# Patient Record
Sex: MALE | Race: OTHER | NOT HISPANIC OR LATINO
[De-identification: names, ages, dates, MRNs, and addresses within clinical notes are randomized per-mention and may not be internally consistent; named-entity substitution may affect disease eponyms.]

---

## 2023-06-30 PROBLEM — Z00.00 ENCOUNTER FOR PREVENTIVE HEALTH EXAMINATION: Status: ACTIVE | Noted: 2023-06-30

## 2023-07-03 ENCOUNTER — APPOINTMENT (OUTPATIENT)
Dept: NEUROSURGERY | Facility: CLINIC | Age: 73
End: 2023-07-03
Payer: MEDICARE

## 2023-07-03 VITALS — WEIGHT: 205 LBS | HEIGHT: 70 IN | BODY MASS INDEX: 29.35 KG/M2

## 2023-07-03 VITALS — BODY MASS INDEX: 29.35 KG/M2 | WEIGHT: 205 LBS | HEIGHT: 70 IN

## 2023-07-03 DIAGNOSIS — Z78.9 OTHER SPECIFIED HEALTH STATUS: ICD-10-CM

## 2023-07-03 DIAGNOSIS — I10 ESSENTIAL (PRIMARY) HYPERTENSION: ICD-10-CM

## 2023-07-03 DIAGNOSIS — M43.17 SPONDYLOLISTHESIS, LUMBOSACRAL REGION: ICD-10-CM

## 2023-07-03 DIAGNOSIS — M25.551 PAIN IN RIGHT HIP: ICD-10-CM

## 2023-07-03 DIAGNOSIS — M25.552 PAIN IN RIGHT HIP: ICD-10-CM

## 2023-07-03 DIAGNOSIS — M48.062 SPINAL STENOSIS, LUMBAR REGION WITH NEUROGENIC CLAUDICATION: ICD-10-CM

## 2023-07-03 PROCEDURE — 99213 OFFICE O/P EST LOW 20 MIN: CPT

## 2023-07-03 PROCEDURE — 99204 OFFICE O/P NEW MOD 45 MIN: CPT

## 2023-07-03 RX ORDER — OXYCODONE AND ACETAMINOPHEN 5; 325 MG/1; MG/1
5-325 TABLET ORAL
Refills: 0 | Status: ACTIVE | COMMUNITY

## 2023-07-03 RX ORDER — HYDROCHLOROTHIAZIDE 12.5 MG/1
CAPSULE ORAL
Refills: 0 | Status: ACTIVE | COMMUNITY

## 2023-07-03 RX ORDER — AMLODIPINE BESYLATE 5 MG/1
TABLET ORAL
Refills: 0 | Status: ACTIVE | COMMUNITY

## 2023-07-03 RX ORDER — METFORMIN HYDROCHLORIDE 625 MG/1
TABLET ORAL
Refills: 0 | Status: ACTIVE | COMMUNITY

## 2023-07-03 RX ORDER — ATORVASTATIN CALCIUM 80 MG/1
TABLET, FILM COATED ORAL
Refills: 0 | Status: ACTIVE | COMMUNITY

## 2023-07-03 RX ORDER — ESCITALOPRAM OXALATE 5 MG/1
5 TABLET ORAL
Refills: 0 | Status: ACTIVE | COMMUNITY

## 2023-07-03 RX ORDER — HYDROXYZINE HYDROCHLORIDE 50 MG/1
50 TABLET ORAL
Refills: 0 | Status: ACTIVE | COMMUNITY

## 2023-07-03 NOTE — HISTORY OF PRESENT ILLNESS
[de-identified] : HPI: \par \par Mr. TAYLOR has a longstanding history of chronic lower back pain with neurogenic claudication into the bilateral lower extremities.  He is under the care of Dr. Arango for pain management.  He has tried various interventional procedures such as lumbar epidurals and lumbar radiofrequency with temporary relief. He has also trialed PT. He also reports chronic numbness in his feet mainly under his toes.  He is a diabetic.  Symptoms worsen with prolonged standing and walking.  Sitting is relatively comfortable.\par \par His last MRI of the lumbar spine was from 7/6/2022 at [facility].  Per the report he is found to have a grade 1 listhesis at L3-4 with moderate to severe stenosis.  There is clumping of the nerve roots at this segment suggestive of arachnoiditis.\par \par He is medically managed by pain management on gabapentin and oxycodone as needed. No bowel / bladder dysfunction.\par \par PHYSICAL EXAM: \par Constitutional: Well appearing, no distress\par HEENT: Normocephalic Atraumatic\par Psychiatric: Alert and oriented to all spheres, normal mood\par Pulmonary: No respiratory distress\par Neurologic: \par CN II-XII grossly intact\par Palpation: no pain to palpation \par Strength: Full strength in all major muscle groups\par Sensation: Full sensation to light touch in all extremities, however he has numbness on the plantar aspects of his feet. \par Reflexes: \par  2+ patellar\par  2+ ankle jerk\par Restriction in ROM LS spine. \par Signs:\par SLR negative\par Gait: steady, walking w/o assistance. \par \par \par \par

## 2023-07-03 NOTE — ASSESSMENT
[FreeTextEntry1] : Mr. TAYLOR presents with chronic lower back pain and neurogenic claudication into the lower extremities.  He will proceed with an updated MRI of the lumbar spine for surgical planning.  X-rays of the lumbar spine with flexion-extension views and dedicated hip x-rays will also be performed.  He has trialed conservative management without long-term relief.  Although he is hesitant in regards to structural spine surgery he has exhausted conservative management with PT and injections, therefore has been referred today to discuss possible surgical treatment options. His pain specialist (Dr. Arango) briefly discussed the option of MILD procedure however recommended a surgical consult before considering this option.  I will see him back with Dr. Anna once testing is completed.  He is agreeable with the plan of care.\par \par Meme Porras, MS ZHOU\par Micheal Anna MD \par \par \par

## 2023-07-17 ENCOUNTER — APPOINTMENT (OUTPATIENT)
Dept: NEUROSURGERY | Facility: CLINIC | Age: 73
End: 2023-07-17
Payer: MEDICARE

## 2023-07-17 VITALS — WEIGHT: 205 LBS | BODY MASS INDEX: 29.35 KG/M2 | HEIGHT: 70 IN

## 2023-07-17 DIAGNOSIS — M43.17 SPONDYLOLISTHESIS, LUMBOSACRAL REGION: ICD-10-CM

## 2023-07-17 DIAGNOSIS — M48.062 SPINAL STENOSIS, LUMBAR REGION WITH NEUROGENIC CLAUDICATION: ICD-10-CM

## 2023-07-17 PROCEDURE — 99213 OFFICE O/P EST LOW 20 MIN: CPT

## 2023-08-16 PROBLEM — M43.17 ACQUIRED SPONDYLOLISTHESIS OF LUMBOSACRAL REGION: Status: ACTIVE | Noted: 2023-07-03

## 2023-08-16 PROBLEM — M48.062 LUMBAR STENOSIS WITH NEUROGENIC CLAUDICATION: Status: ACTIVE | Noted: 2023-07-03

## 2023-08-16 NOTE — PHYSICAL EXAM
[FreeTextEntry1] : Constitutional: Well appearing, no distress HEENT: Normocephalic Atraumatic Psychiatric: Alert and oriented to all spheres, normal mood Pulmonary: no respiratory distress Abdomen: non-distended Vascular/Extremities: no edema, no cyanosis, no clubbing   Neurologic:  CN II-XII grossly intact ROM: diminshed Ls spine Palpation: no pain to palpation in cervical spine, no pain to palpation in lumbar spine Strength: Full strength in all major muscle groups, no atrophy Sensation: Full sensation to light touch in all extremities Reflexes:                2+ patellar               2+ biceps               2+ ankle jerk              No Mcclain's              No clonus              No babinski  Signs: SLR negative L'hermitte's negative  Gait: antalgic

## 2023-08-16 NOTE — ASSESSMENT
[FreeTextEntry1] : We discussed Mild procedure vs. L3-5 LTIF with full decompression. We discussed that while I think it is unlikely MILD will offer significant  long term relief it is not unreasonable should he want to attempt the minimal procedure. he may still require fusion procedure and the MILD procedure is not without risk. he is aware and will consider his options. We discussed the signs/symptoms of cauda equina and what constitutes surgical urgency and is aware to call/go to ER for any such concerns.

## 2023-08-16 NOTE — HISTORY OF PRESENT ILLNESS
[FreeTextEntry1] : CHIEF COMPLAINT: Mr. Fields, Presents today still with Lower back pain. He explains when walking he will have pain in the groin and hips. He is having numbness and tingling in the right leg.    CONSERVATIVE MANAGEMENT TRIALED:  He went to pain management where he obtained an ablation which gives some relief. He goes to PT twice a week. which gives some relief as well     DIAGNOSTIC TESTING: [facility] which shows L3-4 spondylolisthesis grade I with severe stenosis, unchanged.

## 2024-10-13 ENCOUNTER — EMERGENCY (EMERGENCY)
Facility: HOSPITAL | Age: 74
LOS: 0 days | Discharge: HOME | End: 2024-10-13
Attending: EMERGENCY MEDICINE
Payer: MEDICARE

## 2024-10-13 VITALS
DIASTOLIC BLOOD PRESSURE: 92 MMHG | SYSTOLIC BLOOD PRESSURE: 182 MMHG | OXYGEN SATURATION: 97 % | TEMPERATURE: 98 F | HEART RATE: 67 BPM | RESPIRATION RATE: 18 BRPM

## 2024-10-13 DIAGNOSIS — K08.89 OTHER SPECIFIED DISORDERS OF TEETH AND SUPPORTING STRUCTURES: ICD-10-CM

## 2024-10-13 DIAGNOSIS — K03.81 CRACKED TOOTH: ICD-10-CM

## 2024-10-13 PROCEDURE — 64400 NJX AA&/STRD TRIGEMINAL NRV: CPT

## 2024-10-13 PROCEDURE — 64400 NJX AA&/STRD TRIGEMINAL NRV: CPT | Mod: GC,LT

## 2024-10-13 PROCEDURE — 99283 EMERGENCY DEPT VISIT LOW MDM: CPT | Mod: 25

## 2024-10-13 PROCEDURE — 99283 EMERGENCY DEPT VISIT LOW MDM: CPT | Mod: 25,GC

## 2024-10-13 RX ADMIN — Medication 600 MILLIGRAM(S): at 13:15

## 2024-10-13 NOTE — ED ADULT NURSE NOTE - NSFALLRISKINTERV_ED_ALL_ED

## 2024-10-13 NOTE — ED PROCEDURE NOTE - CPROC ED ANATOMIC LOCATION1
anterior border of mandible ramus, intraoral coronoid notch, and occlusal plane of molars/junction between 2nd pre-molar and molar

## 2024-10-13 NOTE — ED PROVIDER NOTE - NSFOLLOWUPINSTRUCTIONS_ED_ALL_ED_FT
Our Emergency Department Referral Coordinators will be reaching out to you in the next 24-48 hours from 9:00am to 5:00pm (Monday to Friday) with a follow up appointment. Please expect a phone call from the hospital in that time frame. If you do not receive a call or if you have any questions or concerns, you can reach them at [phone]      Dental Pain    Dental pain is often a sign that something is wrong with your teeth or gums. It is also something that can occur following dental treatment. If you have dental pain, it is important to contact your dental care provider, especially if the cause of the pain has not been determined. Dental pain may be of varying intensity and can be caused by many things, including:  Tooth decay (cavities or caries). Cavities are caused by bacteria that produce acids that irritate the nerve of your tooth, making it sensitive to air and hot or cold temperatures. This eventually causes discomfort or pain.  Abscess or infection. Once the bacteria reach the inner part of the tooth (pulp), a bacterial infection (dental abscess) can occur. Pus typically collects at the end of the root of a tooth.  Injury.  A crack in the tooth.  Gum recession exposing the root, and possibly the nerves, of a tooth.  Gum (periodontal)disease.  Abnormal grinding or clenching.  Poor or improper home care.  An unknown reason (idiopathic).  Your pain may be mild or severe. It may occur when you are:  Chewing.  Exposed to hot or cold temperatures.  Eating or drinking sugary foods or beverages, such as soda or candy.  Your pain may be constant, or it may come and go without cause.    Follow these instructions at home:  The following actions may help to lessen any discomfort that you are feeling before or after getting dental care.    Medicines    Take over-the-counter and prescription medicines only as told by your dental care provider.  If you were prescribed an antibiotic medicine, take it as told by your dental care provider. Do not stop taking the antibiotic even if you start to feel better.  Eating and drinking    Avoid foods or drinks that cause you pain, such as:  Very hot or very cold foods or drinks.  Sweet or sugary foods or drinks.  Managing pain and swelling      Ice can sometimes be used to reduce pain and swelling, especially if the pain is following dental treatment.  If directed, put ice on the painful area of your face. To do this:  Put ice in a plastic bag.  Place a towel between your skin and the bag.  Leave the ice on for 20 minutes, 2–3 times a day.  Remove the ice if your skin turns bright red. This is very important. If you cannot feel pain, heat, or cold, you have a greater risk of damage to the area.  Brushing your teeth    To keep your mouth and gums healthy, brush your teeth twice a day using a fluoride toothpaste.  Use a toothpaste made for sensitive teeth as directed by your dental care provider, especially if the root is exposed.  Always brush your teeth with a soft-bristled toothbrush. This will help prevent irritation to your gums.  General instructions    Floss at least once a day.  Do not apply heat to the outside of the face.  Gargle with a mixture of salt and water 3–4 times a day or as needed. To make salt water, completely dissolve ½–1 tsp (3–6 g) of salt in 1 cup (237 mL) of warm water.  Keep all follow-up visits. This is important.  Contact a dental care provider if:  You have any unexplained dental pain.  Your pain is not controlled with medicines.  Your symptoms get worse.  You have new symptoms.  Get help right away if:  You are unable to open your mouth.  You are having trouble breathing or swallowing.  You have a fever.  You notice that your face, neck, or jaw is swollen.  These symptoms may represent a serious problem that is an emergency. Do not wait to see if the symptoms will go away. Get medical help right away. Call your local emergency services (911 in the [place] Do not drive yourself to the hospital.    Summary  Dental pain may be caused by many things, including tooth decay and infection.  Your pain may be mild or severe.  Take over-the-counter and prescription medicines only as told by your dental care provider.  Watch your dental pain for any changes. Let your dental care provider know if your symptoms get worse.

## 2024-10-13 NOTE — ED PROVIDER NOTE - PROGRESS NOTE DETAILS
NANCY TOVAR:  SHAYLEE. Inferior Alveolar & Infraorbial blocks performed. Patient refers symptomatic improvement.   Will d/c patient with abx and follow-up in dental clinic for further evaluation/tooth extraction/root canal. NANCY TOVAR:  Dental nerve block procedures offered to patient. Risks and benefits of procedures discussed with patient. Patient verbally confirmed he understood risks and benefits of the procedure and would it performed.

## 2024-10-13 NOTE — ED PROVIDER NOTE - NSPTACCESSSVCSAPPTDETAILS_ED_ALL_ED_FT
73m. Generally poor dentition. F/u with dental clinic for tooth extraction/root canal. Patient requesting financial assistance.

## 2024-10-13 NOTE — ED PROVIDER NOTE - PATIENT PORTAL LINK FT
You can access the FollowMyHealth Patient Portal offered by [facility] by registering at the following website: [email or web address]. By joining [facility] [employer]’s FollowMyHealth portal, you will also be able to view your health information using other applications (apps) compatible with our system. rehabilitation therapy//nutritionist

## 2024-10-13 NOTE — ED PROVIDER NOTE - OBJECTIVE STATEMENT
Case of a 73 year old male who comes to the ED for dental pain. Patient refers he last saw a dentist 6-8 months ago who told him he had to have 3 teeth pulled and another 5 teeth with root canals. Patient refers he tries to take care of his teeth, but that he has had increasingly poor dentition because he can not afford to have the procedures performed at this time. Patient refers he wears a mask around because he is increasingly embarrassed of his poor dentition. Patient would like some pain relief at this time. Tolerating PO intake. Otherwise denies fevers, headache, chills, cp, sob, n/v/d, abd pain, back pain, changes in urinary/stool habitus, calf tenderness or swelling, recent travel, and sick contacts.

## 2024-10-13 NOTE — ED PROVIDER NOTE - PHYSICAL EXAMINATION
CONSTITUTIONAL: well-appearing, in NAD  SKIN: Warm dry, normal skin turgor  HEAD: NCAT  EYES: EOMI, PERRLA, no scleral icterus, conjunctiva pink  ENT: normal pharynx with no erythema or exudates  Mouth: Generally poor dentition. Absence of upper left cuspids. Left upper lateral incisor broken. Right second cuspid cracked down to gum line. Pain to palpation of left second biscuspid and first molar.   NECK: Supple; non tender. Full ROM.

## 2024-10-15 ENCOUNTER — EMERGENCY (EMERGENCY)
Facility: HOSPITAL | Age: 74
LOS: 0 days | Discharge: LEFT BEFORE DISCHARGE | End: 2024-10-15
Attending: STUDENT IN AN ORGANIZED HEALTH CARE EDUCATION/TRAINING PROGRAM
Payer: MEDICARE

## 2024-10-15 ENCOUNTER — OUTPATIENT (OUTPATIENT)
Dept: OUTPATIENT SERVICES | Facility: HOSPITAL | Age: 74
LOS: 1 days | End: 2024-10-15
Payer: MEDICARE

## 2024-10-15 VITALS
TEMPERATURE: 98 F | WEIGHT: 199.96 LBS | OXYGEN SATURATION: 99 % | RESPIRATION RATE: 18 BRPM | SYSTOLIC BLOOD PRESSURE: 185 MMHG | HEART RATE: 91 BPM | DIASTOLIC BLOOD PRESSURE: 90 MMHG

## 2024-10-15 DIAGNOSIS — K08.89 OTHER SPECIFIED DISORDERS OF TEETH AND SUPPORTING STRUCTURES: ICD-10-CM

## 2024-10-15 DIAGNOSIS — Z53.29 PROCEDURE AND TREATMENT NOT CARRIED OUT BECAUSE OF PATIENT'S DECISION FOR OTHER REASONS: ICD-10-CM

## 2024-10-15 DIAGNOSIS — K02.52 DENTAL CARIES ON PIT AND FISSURE SURFACE PENETRATING INTO DENTIN: ICD-10-CM

## 2024-10-15 DIAGNOSIS — I10 ESSENTIAL (PRIMARY) HYPERTENSION: ICD-10-CM

## 2024-10-15 PROCEDURE — D0220: CPT

## 2024-10-15 PROCEDURE — 99282 EMERGENCY DEPT VISIT SF MDM: CPT

## 2024-10-15 PROCEDURE — D7140: CPT

## 2024-10-15 PROCEDURE — D0140: CPT

## 2024-10-15 PROCEDURE — 99283 EMERGENCY DEPT VISIT LOW MDM: CPT | Mod: FS

## 2024-10-15 NOTE — ED PROVIDER NOTE - PHYSICAL EXAMINATION
Physical Exam    Vital Signs: I have reviewed the initial vital signs.  Constitutional: well-nourished, appears stated age, no acute distress  Dental: +pain and swelling to left lower posterior gums  Neuro: AOx3, No focal deficits noted

## 2024-10-15 NOTE — ED PROVIDER NOTE - CLINICAL SUMMARY MEDICAL DECISION MAKING FREE TEXT BOX
73-year-old presents today for lower dental pain and swelling.  Patient was seen in the ED and given prescription for amoxicillin.  Patient still having mild pain.  Patient went to follow-up with dental.  Attempted to get patient to dental clinic however he left the ED.  Called into clinic and apparently patient had gone to the dental clinic.

## 2024-10-15 NOTE — ED PROVIDER NOTE - OBJECTIVE STATEMENT
73-year-old male history of hypertension, back problems complaining of left lower dental pain and swelling since Sunday.  He was seen in the ED at that time and was given prescription for amoxicillin.  Patient still with pain.  No fevers

## 2024-10-16 DIAGNOSIS — K02.9 DENTAL CARIES, UNSPECIFIED: ICD-10-CM
